# Patient Record
Sex: FEMALE | Race: BLACK OR AFRICAN AMERICAN | Employment: FULL TIME | URBAN - METROPOLITAN AREA
[De-identification: names, ages, dates, MRNs, and addresses within clinical notes are randomized per-mention and may not be internally consistent; named-entity substitution may affect disease eponyms.]

---

## 2017-10-02 ENCOUNTER — HOSPITAL ENCOUNTER (EMERGENCY)
Age: 37
Discharge: HOME | End: 2017-10-02
Attending: EMERGENCY MEDICINE
Payer: COMMERCIAL

## 2017-10-02 VITALS
RESPIRATION RATE: 18 BRPM | OXYGEN SATURATION: 100 % | SYSTOLIC BLOOD PRESSURE: 138 MMHG | HEART RATE: 80 BPM | DIASTOLIC BLOOD PRESSURE: 86 MMHG | TEMPERATURE: 98.6 F

## 2017-10-02 DIAGNOSIS — G56.21 ULNAR NEUROPATHY OF RIGHT UPPER EXTREMITY: Primary | ICD-10-CM

## 2017-10-02 PROCEDURE — 99282 EMERGENCY DEPT VISIT SF MDM: CPT

## 2017-10-02 NOTE — ED TRIAGE NOTES
\"I have some numbness in my right hand in my last three fingers and a little up my right arm. I woke up like this. \"

## 2017-10-02 NOTE — ED PROVIDER NOTES
HPI Comments: 5:28 PM Juan Carlos Joyner is a 44 y.o. female who presents to the ED c/o right hand pain, numbness, and paresthesia that began yesterday when she woke up. The patient states that her hand pain radiates to her elbow \"like she hit her funny bone. \" The patient denies sleeping in a strange position. She denies HA, extremity weakness, and additional complaints or concerns. The history is provided by the patient. History reviewed. No pertinent past medical history. History reviewed. No pertinent surgical history. History reviewed. No pertinent family history. Social History     Social History    Marital status:      Spouse name: N/A    Number of children: N/A    Years of education: N/A     Occupational History    Not on file. Social History Main Topics    Smoking status: Not on file    Smokeless tobacco: Not on file    Alcohol use Not on file    Drug use: Not on file    Sexual activity: Not on file     Other Topics Concern    Not on file     Social History Narrative    No narrative on file         ALLERGIES: Review of patient's allergies indicates no known allergies. Review of Systems   Constitutional: Negative for diaphoresis and fever. HENT: Negative for congestion and sore throat. Eyes: Negative for pain and itching. Respiratory: Negative for cough and shortness of breath. Cardiovascular: Negative for chest pain and palpitations. Gastrointestinal: Negative for abdominal pain and diarrhea. Endocrine: Negative for polydipsia and polyuria. Genitourinary: Negative for dysuria and hematuria. Musculoskeletal: Positive for myalgias (right hand to elbow). Negative for arthralgias. Skin: Negative for rash and wound. Neurological: Positive for numbness (right hand). Negative for seizures and syncope. +right hand paresthesia     Hematological: Does not bruise/bleed easily.    Psychiatric/Behavioral: Negative for agitation and documentation, reviewed the documentation, as recorded by the scribe in my presence, and it accurately and completely records my words and actions.  October 02, 2017 at 5:34 PM - Jenaro Casey MD

## 2017-10-02 NOTE — DISCHARGE INSTRUCTIONS
Ulnar Neuropathy (Handlebar Palsy): Exercises  Your Care Instructions  Here are some examples of typical rehabilitation exercises for your condition. Start each exercise slowly. Ease off the exercise if you start to have pain. Your doctor or your physical or occupational therapist will tell you when you can start these exercises and which ones will work best for you. How to do the exercises  Neck rotation    1. Sit in a firm chair, or stand up straight. 2. Keeping your chin level, turn your head to the right, and hold for 15 to 30 seconds. 3. Turn your head to the left, and hold for 15 to 30 seconds. 4. Repeat 2 to 4 times to each side. Shoulder blade squeeze    1. While standing with your arms at your sides, squeeze your shoulder blades together. Do not raise your shoulders as you are squeezing. 2. Hold for 6 seconds. 3. Repeat 8 to 12 times. Neck stretches    1. Look straight ahead, and tip your right ear to your right shoulder. Do not let your left shoulder rise as you tip your head to the right. 2. Hold for 15 to 30 seconds. 3. Tilt your head to the left. Do not let your right shoulder rise as you tip your head to the left. 4. Hold for 15 to 30 seconds. 5. Repeat 2 to 4 times to each side. Elbow flexion and extension    Note: If this exercise causes numbness, tingling, or pain in your hand, ease off of the stretch. You should not have symptoms as you stretch. If you cannot back off enough so that you can do the exercise without symptoms, stop doing the exercise right away. 1. Stand with your arms relaxed at your sides. 2. With your affected arm, gently bend your elbow up toward you as far as possible. 3. Then straighten your arm as much as you can. 4. Repeat 2 to 4 times. Wrist flexor stretch    Note: If this exercise causes numbness, tingling, or pain in your hand, ease off of the stretch. You should not have symptoms as you stretch.  If you cannot back off enough so that you can do the exercise without symptoms, stop doing the exercise right away. 1. Extend your affected arm in front of you with your palm facing away from your body. 2. Bend back your wrist on your affected arm, pointing your hand up toward the ceiling. 3. With your other hand, gently bend your wrist farther until you feel a mild to moderate stretch in your forearm. 4. Hold for at least 15 to 30 seconds. 5. Repeat 2 to 4 times. 6. Repeat steps 1 through 5, but this time extend your affected arm in front of you with your palm facing up. Then bend back your wrist, pointing your hand toward the floor. Wrist flexion and extension    Note: If this exercise causes numbness, tingling, or pain in your hand, ease off of the stretch. You should not have symptoms as you stretch. If you cannot back off enough so that you can do the exercise without symptoms, stop doing the exercise right away. 1. Place your forearm on a table, with your affected hand and wrist extended beyond the table, palm down. 2. Slowly bend your wrist to move your hand upward and allow your hand to close into a fist. Hold for about 6 seconds. 3. Then lower your hand and allow your fingers to relax. Hold this position for about 6 seconds. You should feel a gentle stretch. 4. Repeat 8 to 12 times. Follow-up care is a key part of your treatment and safety. Be sure to make and go to all appointments, and call your doctor if you are having problems. It's also a good idea to know your test results and keep a list of the medicines you take. Where can you learn more? Go to http://emily-jessica.info/. Enter M614 in the search box to learn more about \"Ulnar Neuropathy (Handlebar Palsy): Exercises. \"  Current as of: March 21, 2017  Content Version: 11.3  © 7222-3051 [employer]. Care instructions adapted under license by [employer] (which disclaims liability or warranty for this information).  If you have questions about a medical condition or this instruction, always ask your healthcare professional. [place] [address] any warranty or liability for your use of this information.

## 2021-09-13 NOTE — ED NOTES
I have reviewed discharge instructions with the patient. The patient verbalized understanding. Patient armband removed and shredded. >4h